# Patient Record
Sex: MALE | Race: ASIAN | NOT HISPANIC OR LATINO | Employment: UNEMPLOYED | URBAN - METROPOLITAN AREA
[De-identification: names, ages, dates, MRNs, and addresses within clinical notes are randomized per-mention and may not be internally consistent; named-entity substitution may affect disease eponyms.]

---

## 2019-06-23 ENCOUNTER — HOSPITAL ENCOUNTER (EMERGENCY)
Facility: OTHER | Age: 39
Discharge: HOME | End: 2019-06-23
Attending: EMERGENCY MEDICINE
Payer: COMMERCIAL

## 2019-06-23 VITALS
DIASTOLIC BLOOD PRESSURE: 62 MMHG | TEMPERATURE: 99 F | RESPIRATION RATE: 16 BRPM | HEART RATE: 104 BPM | OXYGEN SATURATION: 97 % | WEIGHT: 222 LBS | SYSTOLIC BLOOD PRESSURE: 125 MMHG

## 2019-06-23 DIAGNOSIS — S86.111A STRAIN OF RIGHT GASTROCNEMIUS MUSCLE, INITIAL ENCOUNTER: Primary | ICD-10-CM

## 2019-06-23 PROCEDURE — 25000003 PHARM REV CODE 250: Performed by: EMERGENCY MEDICINE

## 2019-06-23 PROCEDURE — 99284 EMERGENCY DEPT VISIT MOD MDM: CPT

## 2019-06-23 RX ORDER — NAPROXEN 500 MG/1
500 TABLET ORAL 2 TIMES DAILY PRN
Qty: 60 TABLET | Refills: 0 | Status: SHIPPED | OUTPATIENT
Start: 2019-06-23

## 2019-06-23 RX ORDER — METHOCARBAMOL 750 MG/1
1500 TABLET, FILM COATED ORAL 3 TIMES DAILY PRN
Qty: 30 TABLET | Refills: 0 | Status: SHIPPED | OUTPATIENT
Start: 2019-06-23 | End: 2019-06-28

## 2019-06-23 RX ORDER — NAPROXEN 500 MG/1
500 TABLET ORAL
Status: COMPLETED | OUTPATIENT
Start: 2019-06-23 | End: 2019-06-23

## 2019-06-23 RX ORDER — LIDOCAINE 50 MG/G
PATCH TOPICAL
Qty: 30 PATCH | Refills: 0 | Status: SHIPPED | OUTPATIENT
Start: 2019-06-23 | End: 2019-06-25 | Stop reason: ALTCHOICE

## 2019-06-23 RX ADMIN — NAPROXEN 500 MG: 500 TABLET ORAL at 07:06

## 2019-06-24 NOTE — DISCHARGE INSTRUCTIONS
Call your primary care doctor to make the first available appointment.     Keep all your medical appointments.     Take your regular medication as prescribed. Contact your primary care provider before running out of medication, or for any problems obtaining them.    Do not drive or operate heavy machinery while taking opioid or muscle relaxing medications. Examples include norco, percocet, xanax, valium, flexeril.     Overuse or long term use of pain and sedating medication may lead to addiction, dependence, organ failure, family and work problems, legal problems, accidental overdose and death.    If you do not have health insurance, you probably qualify for heavily discounted rates:  Call 1-195.639.4204 ([facility] hotline) or go to [email or web address].gov    Your evaluation in the ED does not suggest any emergent or life threatening medical condition requiring admission or immediate intervention beyond that provided in the ED.   However, the signs of a serious problem sometimes take more time to appear.   RETURN TO THE ER if any of the following occur:    Weakness, dizziness, fainting, or loss of consciousness   Fever of 100.4ºF (38ºC) or higher  Any worse symptoms  Any new or concerning symptoms    Do the following to improve pain and swelling:  Rest. Limit the use of the injured body part. This helps prevent further damage to the body part and gives it time to heal. In some cases, you may need a sling, brace, splint, or cast to help keep the body part still until it has healed.   Ice. Applying ice right after an injury helps relieve pain and swelling. Wrap a bag of ice in a thin towel. (Frozen peas also works well.) Then, place it over the injured area. Do this for 10 to 15 minutes every 3 to 4 hours. Continue for the next 1 to 2 days or until your symptoms improve. Never put ice directly on your skin or ice an area longer than 15 minutes at a time.   Compression. Putting pressure on an injury helps reduce swelling and  provides support. Wrap the injured area firmly with an elastic bandage (ACE wrap). Make sure not to wrap the bandage too tightly or you will cut off blood flow to the injured area. If your bandage loosens, rewrap it. Do not wear an elastic bandage overnight.  Elevation. Keeping an injury raised above the level of your heart reduces swelling, pain, and throbbing. For instance, if you have a broken leg, it may help to rest your leg on several pillows when sitting or lying down.

## 2019-06-24 NOTE — ED PROVIDER NOTES
"Encounter Date: 6/23/2019    SCRIBE #1 NOTE: I, Penny Eden, am scribing for, and in the presence of, Dr. Mariano.       History     Chief Complaint   Patient presents with    calf pain     right calf pain after jumping into pool, increased pain with ambulation, unable to bear full weight. No swelling or bruising noted.      Time seen by provider: 7:22 PM    This is a 39 y.o. male who presents with complaint of right calf pain. Onset began after jumping in the pool while playing volleyball about one hour ago. He states he "felt the muscle pop out." He experiences the pain only with weight bearing. He denies any other injury. No paraesthesias.    The history is provided by the patient.     Review of patient's allergies indicates:   Allergen Reactions    Sulfa (sulfonamide antibiotics)      Hives       Past Medical History:   Diagnosis Date    Asthma      History reviewed. No pertinent surgical history.  History reviewed. No pertinent family history.  Social History     Tobacco Use    Smoking status: Current Some Day Smoker   Substance Use Topics    Alcohol use: Yes     Comment: social    Drug use: Not Currently     Review of Systems  ROS: As per HPI and below:   General: No fever. No chills. No generalized weakness.   HENT: No sore throat. No rhinorrhea. No facial pain. No facial swelling.   Eyes: No visual changes. No eye pain.   Cardiovascular: No chest pain.   Respiratory: No dyspnea. No cough.   GI: No abdominal pain. No nausea. No vomiting. No diarrhea.   : No dysuria. No hematuria.   Skin: No rashes.  Neuro: No focal weakness. No headache. No syncope. No paraesthesias.  Musculoskeletal: Right calf pain.  Psych: No acute changes.  All other systems negative.     Physical Exam     Initial Vitals [06/23/19 1853]   BP Pulse Resp Temp SpO2   125/62 104 16 98.7 °F (37.1 °C) 97 %      MAP       --         Physical Exam  /62 (BP Location: Left arm, Patient Position: Sitting)   Pulse 104   Temp 98.7 °F " (37.1 °C) (Oral)   Resp 16   Wt 100.7 kg (222 lb)   SpO2 97%     Nursing note and vitals reviewed.  Constitutional: AAOx3. Well appearing.   Eyes: EOMI. No discharge. Anicteric.  HENT:   Mouth/Throat: Oropharynx is clear and moist. Uvula midline.   Neck: Normal range of motion. Neck supple.    Cardiovascular: Normal rate  Pulmonary/Chest: No respiratory distress.   Abdominal: No distension   Musculoskeletal: Normal range of motion. Right Knee: Tenderness at medial calf well localized to the left mid calf. Normal active dorsal flexion and plantar flexion. No achilles's tenderness or defect. No edema. No masses or effusions appreciated. FROM. Normal valgus and varus stresses. Normal anterior and posterior drawer tests. Compartments soft. Distally neurovascularly intact.   Neurological: GCS15. Alert and oriented to person, place, and time. No gross focal strength or light touch deficit.   Skin: Skin is warm and dry.   Psychiatric: Behavior is normal. Judgment normal.    ED Course   Procedures  Labs Reviewed - No data to display       Imaging Results    None                     Scribe Attestation:   Scribe #1: I performed the above scribed service and the documentation accurately describes the services I performed. I attest to the accuracy of the note.    Attending Attestation:           Physician Attestation for Scribe:  Physician Attestation Statement for Scribe #1: I, Dr. Mariano, reviewed documentation, as scribed by Penny Eden in my presence, and it is both accurate and complete.         Attending ED Notes:   Patient is a 39-year-old male with asthma who presents with acute onset right calf pain when jumping in a pool while playing volleyball.  On exam patient unable to bear weight on his right leg, has medial calf tenderness, neurovascularly intact distally, no Achilles tendon tenderness or swelling, normal active dorsiflexion/plantar flexion.  Clinically the patient appears to have gastrocnemius strain and  possible incomplete tear.  Plan is maximal supportive care, rice therapy, PCP follow-up for likely physical therapy referral.  I discussed with patient and/or guardian/caretaker that this evaluation in the ED does not suggest any emergent or life threatening medical condition requiring admission or immediate intervention beyond what was provided in the ED. Regardless, an unremarkable evaluation in the ED does not preclude the development or presence of a serious or life threatening condition. As such, patient was instructed to return immediately for any worsening or change in current symptoms.     I had a detailed discussion with patient  and/or guardian/caretaker regarding findings, plan, return precautions, importance of medication adherence, need to follow-up with a PCP and specialist. All questions answered.     Note was created using voice recognition software. Note may have occasional typographical errors that may not have been identified and edited despite initial review prior to signing.               Clinical Impression:     1. Strain of right gastrocnemius muscle, initial encounter                                 Javier Mariano MD  06/24/19 0702

## 2019-06-24 NOTE — ED TRIAGE NOTES
Pt arrives ot Ed with c/o right leg pain. Pt reports jumping an hearing a pop, pt reports being unable to bear weight. Pt denies pain without weight on leg. Pt reports pain to calf. Pt sitting inn exam bed, respirations even, unlabored, eyes open spontaneously, NAD noted, AAOx4, answering questions appropriately.

## 2019-06-25 ENCOUNTER — OFFICE VISIT (OUTPATIENT)
Dept: INTERNAL MEDICINE | Facility: CLINIC | Age: 39
End: 2019-06-25
Attending: FAMILY MEDICINE
Payer: COMMERCIAL

## 2019-06-25 ENCOUNTER — HOSPITAL ENCOUNTER (OUTPATIENT)
Dept: RADIOLOGY | Facility: HOSPITAL | Age: 39
Discharge: HOME | End: 2019-06-25
Attending: PHYSICIAN ASSISTANT
Payer: COMMERCIAL

## 2019-06-25 ENCOUNTER — OFFICE VISIT (OUTPATIENT)
Dept: ORTHOPEDICS | Facility: CLINIC | Age: 39
End: 2019-06-25
Payer: COMMERCIAL

## 2019-06-25 VITALS — BODY MASS INDEX: 32.34 KG/M2 | HEIGHT: 70 IN | WEIGHT: 225.88 LBS

## 2019-06-25 VITALS
HEART RATE: 69 BPM | SYSTOLIC BLOOD PRESSURE: 122 MMHG | HEIGHT: 70 IN | DIASTOLIC BLOOD PRESSURE: 80 MMHG | WEIGHT: 224 LBS | BODY MASS INDEX: 32.07 KG/M2 | OXYGEN SATURATION: 97 %

## 2019-06-25 DIAGNOSIS — S86.811A STRAIN OF CALF MUSCLE, RIGHT, INITIAL ENCOUNTER: Primary | ICD-10-CM

## 2019-06-25 DIAGNOSIS — S86.811D STRAIN OF CALF MUSCLE, RIGHT, SUBSEQUENT ENCOUNTER: Primary | ICD-10-CM

## 2019-06-25 DIAGNOSIS — M79.604 PAIN OF RIGHT LOWER EXTREMITY: Primary | ICD-10-CM

## 2019-06-25 DIAGNOSIS — M79.604 PAIN OF RIGHT LOWER EXTREMITY: ICD-10-CM

## 2019-06-25 DIAGNOSIS — R07.89 ATYPICAL CHEST PAIN: ICD-10-CM

## 2019-06-25 PROCEDURE — 99203 PR OFFICE/OUTPT VISIT, NEW, LEVL III, 30-44 MIN: ICD-10-PCS | Mod: S$GLB,,, | Performed by: PHYSICIAN ASSISTANT

## 2019-06-25 PROCEDURE — 99999 PR PBB SHADOW E&M-EST. PATIENT-LVL III: CPT | Mod: PBBFAC,,, | Performed by: PHYSICIAN ASSISTANT

## 2019-06-25 PROCEDURE — 99203 OFFICE O/P NEW LOW 30 MIN: CPT | Mod: S$GLB,,, | Performed by: PHYSICIAN ASSISTANT

## 2019-06-25 PROCEDURE — 73590 X-RAY EXAM OF LOWER LEG: CPT | Mod: 26,RT,, | Performed by: RADIOLOGY

## 2019-06-25 PROCEDURE — 3008F BODY MASS INDEX DOCD: CPT | Mod: CPTII,S$GLB,, | Performed by: FAMILY MEDICINE

## 2019-06-25 PROCEDURE — 73590 X-RAY EXAM OF LOWER LEG: CPT | Mod: TC,RT

## 2019-06-25 PROCEDURE — 3008F PR BODY MASS INDEX (BMI) DOCUMENTED: ICD-10-PCS | Mod: CPTII,S$GLB,, | Performed by: PHYSICIAN ASSISTANT

## 2019-06-25 PROCEDURE — 99999 PR PBB SHADOW E&M-EST. PATIENT-LVL III: CPT | Mod: PBBFAC,,, | Performed by: FAMILY MEDICINE

## 2019-06-25 PROCEDURE — 99204 PR OFFICE/OUTPT VISIT, NEW, LEVL IV, 45-59 MIN: ICD-10-PCS | Mod: S$GLB,,, | Performed by: FAMILY MEDICINE

## 2019-06-25 PROCEDURE — 99999 PR PBB SHADOW E&M-EST. PATIENT-LVL III: ICD-10-PCS | Mod: PBBFAC,,, | Performed by: FAMILY MEDICINE

## 2019-06-25 PROCEDURE — 99999 PR PBB SHADOW E&M-EST. PATIENT-LVL III: ICD-10-PCS | Mod: PBBFAC,,, | Performed by: PHYSICIAN ASSISTANT

## 2019-06-25 PROCEDURE — 3008F BODY MASS INDEX DOCD: CPT | Mod: CPTII,S$GLB,, | Performed by: PHYSICIAN ASSISTANT

## 2019-06-25 PROCEDURE — 3008F PR BODY MASS INDEX (BMI) DOCUMENTED: ICD-10-PCS | Mod: CPTII,S$GLB,, | Performed by: FAMILY MEDICINE

## 2019-06-25 PROCEDURE — 73590 XR TIBIA FIBULA 2 VIEW RIGHT: ICD-10-PCS | Mod: 26,RT,, | Performed by: RADIOLOGY

## 2019-06-25 PROCEDURE — 99204 OFFICE O/P NEW MOD 45 MIN: CPT | Mod: S$GLB,,, | Performed by: FAMILY MEDICINE

## 2019-06-25 NOTE — PROGRESS NOTES
Subjective:      Patient ID: Junior Coombs is a 39 y.o. male.    Chief Complaint: Pain of the Right Lower Leg    HPI  39 year old male presents with chief complaint of right calf pain x 2 days. He jumped out of the pool playing volleyball and felt the calf pop. He couldn't get out of the pool initially. He went to the ED and was given crutches. He has been WBAT with 1 crutch. He reports pain at the medial calf with WB. He denies pain at rest. He took naproxen and robaxin for 2 days with no relief. He denies swelling and bruising. No knee or ankle pain.   Review of Systems   Constitution: Negative for chills, fever and night sweats.   Cardiovascular: Negative for chest pain.   Respiratory: Negative for cough and shortness of breath.    Hematologic/Lymphatic: Does not bruise/bleed easily.   Skin: Negative for color change.   Gastrointestinal: Negative for heartburn.   Genitourinary: Negative for dysuria.   Neurological: Negative for numbness and paresthesias.   Psychiatric/Behavioral: Negative for altered mental status.   Allergic/Immunologic: Negative for persistent infections.         Objective:            General    Vitals reviewed.  Constitutional: He is oriented to person, place, and time. He appears well-developed and well-nourished.   Cardiovascular: Normal rate.    Neurological: He is alert and oriented to person, place, and time.             Right LE Exam:  Normal ROM at the knee and ankle  No swelling  No bruising  TTP posteromedial calf  Normal sprague test      X-ray: ordered and reviewed by myself. There is remote trauma with deformity of the proximal tib fib.  No fracture dislocation bone destruction seen.  No acute process seen.      Assessment:       Encounter Diagnosis   Name Primary?    Strain of calf muscle, right, initial encounter Yes          Plan:       Discussed treatment options with patient. He will rest and elevate leg. Alternate ice, heat. He will take naproxen BID x 2 weeks then prn.  Robaxin as needed. RTC in 4 weeks for re-evaluation.

## 2019-06-25 NOTE — LETTER
June 27, 2019      Leon Peoples MD  [address]  [facility] [address]  [facility] [address]           [facility]  [address]  [facility] [address]  Phone: [phone]  Fax: [phone]          Patient: Junior Coombs   MR Number: [id number]   YOB: 1980   Date of Visit: 6/25/2019       Dear Dr. Leon Peoples:    Thank you for referring Junior Coombs to me for evaluation. Attached you will find relevant portions of my assessment and plan of care.    If you have questions, please do not hesitate to call me. I look forward to following Junior Coombs along with you.    Sincerely,    Nuria Mccurdy PA-C    Enclosure  CC:  No Recipients    If you would like to receive this communication electronically, please contact [email or web address] or [phone] to request more information on [email or web address] Link access.    For providers and/or their staff who would like to refer a patient to [facility], please contact us through our one-stop-shop provider referral line, [facility], at [phone].    If you feel you have received this communication in error or would no longer like to receive these types of communications, please e-mail [email or web address]

## 2019-06-25 NOTE — PROGRESS NOTES
CHIEF COMPLAINT: Establish a primary care physician    HISTORY OF PRESENT ILLNESS: The patient is a generally healthy 39 year-old male.  The patient injured his right calf 3 days ago well jumping out of a pool in a water volleyball game.  Pain was severe.  He went to the emergency room and was given crutches.  He does not appear to have an Achilles injury but he does appear to have a gastrocnemius injury.  He is only able to ambulate with crutches.  He cannot bear any weight on the affected leg.      He is a former heavy smoker.  For 15 years he has reproducible musculoskeletal chest pain on the right pectoral region.  It is worse with moving the right arm.    It has been a while since the patient has seen a primary care physician.  The patient wishes to establish a primary care physician.     REVIEW OF SYSTEMS:  GENERAL: No fever, chills, fatigability or weight loss.  SKIN: No rashes, itching or changes in color or texture of skin.  HEAD: No headaches or recent head trauma.  EYES: Visual acuity fine. No photophobia, ocular pain or diplopia.  EARS: Denies ear pain, discharge or vertigo.  NOSE: No loss of smell, no epistaxis or postnasal drip.  MOUTH & THROAT: No hoarseness or change in voice. No excessive gum bleeding.  NODES: Denies swollen glands.  CHEST: Denies MORENO, cyanosis, wheezing, cough and sputum production.  CARDIOVASCULAR: Denies chest pain, PND, orthopnea or reduced exercise tolerance.  ABDOMEN: Appetite fine. No weight loss. Denies diarrhea, abdominal pain, hematemesis or blood in stool.  URINARY: No flank pain, dysuria or hematuria.  PERIPHERAL VASCULAR: No claudication or cyanosis.  MUSCULOSKELETAL: No joint stiffness or swelling. Denies back pain.  NEUROLOGIC: No history of seizures, paralysis, alteration of gait or coordination.    SOCIAL HISTORY: The patient does not smoke.  The patient consumes alcohol socially.  The patient is single.    PHYSICAL EXAMINATION:       APPEARANCE: Well nourished,  well developed, in no acute distress.    HEAD: Normocephalic, atraumatic.  EYES: PERRL. EOMI.  Conjunctivae without injection and  anicteric  EARS: TM's intact. Light reflex normal. No retraction or perforation.    NOSE: Mucosa pink. Airway clear.  MOUTH & THROAT: No tonsillar enlargement. No pharyngeal erythema or exudate. No stridor.  NECK: Supple.   NODES: No cervical, axillary or inguinal lymph node enlargement.  CHEST: Lungs clear to auscultation.  No retractions are noted.  No rales or rhonchi are present.  CARDIOVASCULAR: Normal S1, S2. No rubs, murmurs or gallops.  ABDOMEN: Bowel sounds normal. Not distended. Soft. No tenderness or masses.  No ascites is noted.  MUSCULOSKELETAL:  There is no clubbing, cyanosis, or edema of the extremities x4.  There is full range of motion of the lumbar spine.  There is full range of motion of the extremities x4.  There is no deformity noted.  There is point tenderness and some deformity of the right gastrocnemius laterally.  Achilles is intact  NEUROLOGIC:       Normal speech development.      Hearing normal.  He is ambulating with crutches      Motor and sensory exams grossly normal.  PSYCHIATRIC: Patient is alert and oriented x3.  Thought processes are all normal.  There is no homicidality.  There is no suicidality.  There is no evidence of psychosis.    LABORATORY/RADIOLOGY:   Chart reviewed.      ASSESSMENT:    right gastrocnemius injury  Fifteen years of right-sided musculoskeletal chest pain    PLAN:  See Orthopedics today  He is reassured  Return to clinic in 1 year

## 2019-07-23 ENCOUNTER — OFFICE VISIT (OUTPATIENT)
Dept: ORTHOPEDICS | Facility: CLINIC | Age: 39
End: 2019-07-23
Payer: COMMERCIAL

## 2019-07-23 VITALS — HEIGHT: 70 IN | WEIGHT: 227.75 LBS | BODY MASS INDEX: 32.61 KG/M2

## 2019-07-23 DIAGNOSIS — S86.811D STRAIN OF CALF MUSCLE, RIGHT, SUBSEQUENT ENCOUNTER: Primary | ICD-10-CM

## 2019-07-23 PROCEDURE — 99999 PR PBB SHADOW E&M-EST. PATIENT-LVL III: ICD-10-PCS | Mod: PBBFAC,,, | Performed by: PHYSICIAN ASSISTANT

## 2019-07-23 PROCEDURE — 99999 PR PBB SHADOW E&M-EST. PATIENT-LVL III: CPT | Mod: PBBFAC,,, | Performed by: PHYSICIAN ASSISTANT

## 2019-07-23 PROCEDURE — 3008F BODY MASS INDEX DOCD: CPT | Mod: CPTII,S$GLB,, | Performed by: PHYSICIAN ASSISTANT

## 2019-07-23 PROCEDURE — 99213 PR OFFICE/OUTPT VISIT, EST, LEVL III, 20-29 MIN: ICD-10-PCS | Mod: S$GLB,,, | Performed by: PHYSICIAN ASSISTANT

## 2019-07-23 PROCEDURE — 99213 OFFICE O/P EST LOW 20 MIN: CPT | Mod: S$GLB,,, | Performed by: PHYSICIAN ASSISTANT

## 2019-07-23 PROCEDURE — 3008F PR BODY MASS INDEX (BMI) DOCUMENTED: ICD-10-PCS | Mod: CPTII,S$GLB,, | Performed by: PHYSICIAN ASSISTANT

## 2019-07-23 NOTE — PROGRESS NOTES
Subjective:      Patient ID: Junior Coombs is a 39 y.o. male.    Chief Complaint: Pain of the Right Lower Leg    HPI  Patient returns for f/u for his right calf strain that occurred about 4 weeks ago. He denies pain and swelling. He took the naproxen until about 4 days ago and says it helped a lot. No bruising. He hasn't used crutches in about 2 weeks.   Review of Systems   Constitution: Negative for chills, fever and night sweats.   Cardiovascular: Negative for chest pain.   Respiratory: Negative for cough and shortness of breath.    Hematologic/Lymphatic: Does not bruise/bleed easily.   Skin: Negative for color change.   Gastrointestinal: Negative for heartburn.   Genitourinary: Negative for dysuria.   Neurological: Negative for numbness and paresthesias.   Psychiatric/Behavioral: Negative for altered mental status.   Allergic/Immunologic: Negative for persistent infections.         Objective:            General    Vitals reviewed.  Constitutional: He is oriented to person, place, and time. He appears well-developed and well-nourished.   Cardiovascular: Normal rate.    Neurological: He is alert and oriented to person, place, and time.             Right LE Exam:  Normal ROM at the knee and ankle  No swelling  No bruising  No TTP   Normal sprague test          Assessment:       Encounter Diagnosis   Name Primary?    Strain of calf muscle, right, subsequent encounter Yes          Plan:       Patient is doing well and denies pain. He will f/u as needed.

## 2020-10-05 ENCOUNTER — PATIENT MESSAGE (OUTPATIENT)
Dept: ADMINISTRATIVE | Facility: HOSPITAL | Age: 40
End: 2020-10-05

## 2020-10-12 ENCOUNTER — OFFICE VISIT (OUTPATIENT)
Dept: URGENT CARE | Facility: CLINIC | Age: 40
End: 2020-10-12
Payer: COMMERCIAL

## 2020-10-12 VITALS
BODY MASS INDEX: 32.5 KG/M2 | WEIGHT: 227 LBS | RESPIRATION RATE: 18 BRPM | HEART RATE: 64 BPM | OXYGEN SATURATION: 95 % | SYSTOLIC BLOOD PRESSURE: 133 MMHG | DIASTOLIC BLOOD PRESSURE: 86 MMHG | HEIGHT: 70 IN | TEMPERATURE: 98 F

## 2020-10-12 DIAGNOSIS — R43.2 LOSS OF TASTE: ICD-10-CM

## 2020-10-12 DIAGNOSIS — R09.81 SINUS CONGESTION: Primary | ICD-10-CM

## 2020-10-12 LAB
CTP QC/QA: YES
SARS-COV-2 RDRP RESP QL NAA+PROBE: NEGATIVE

## 2020-10-12 PROCEDURE — 99214 OFFICE O/P EST MOD 30 MIN: CPT | Mod: S$GLB,,, | Performed by: NURSE PRACTITIONER

## 2020-10-12 PROCEDURE — 99214 PR OFFICE/OUTPT VISIT, EST, LEVL IV, 30-39 MIN: ICD-10-PCS | Mod: S$GLB,,, | Performed by: NURSE PRACTITIONER

## 2020-10-12 PROCEDURE — U0002: ICD-10-PCS | Mod: QW,S$GLB,, | Performed by: NURSE PRACTITIONER

## 2020-10-12 PROCEDURE — U0002 COVID-19 LAB TEST NON-CDC: HCPCS | Mod: QW,S$GLB,, | Performed by: NURSE PRACTITIONER

## 2020-10-12 NOTE — PATIENT INSTRUCTIONS
Go to the Emergency Room if symptoms or condition worsens in any way     As discussed, your test today was negative, given symptoms I would take caution and quarantine away from pregnant wife.  If your symptoms continue or worsen please follow up as discussed.     Zyrtec, Claritin, or Allegra OTC as directed for the next 7 days     Flonase OTC as directed for the next 7 days     Salt Water Nasal Spray OTC 3x/day for the next 7 days     Tylenol 500mg 2 tabs by mouth every 6 hours     Mucinex or Robitussin OTC as directed for cough    Sudafed as directed for runny nose and congestion    Sinusitis (No Antibiotics)    The sinuses are air-filled spaces within the bones of the face. They connect to the inside of the nose. Sinusitis is an inflammation of the tissue lining the sinus cavity. Sinus inflammation can occur during a cold. It can also be due to allergies to pollens and other particles in the air. It can cause symptoms such as sinus congestion, headache, sore throat, facial swelling and fullness. It may also cause a low-grade fever. No infection is present, and no antibiotic treatment is needed.  Home care  · Drink plenty of water, hot tea, and other liquids. This may help thin mucus. It also may promote sinus drainage.  · Heat may help soothe painful areas of the face. Use a towel soaked in hot water. Or,  the shower and direct the hot spray onto your face. Using a vaporizer along with a menthol rub at night may also help.   · An expectorant containing guaifenesin may help thin the mucus and promote drainage from the sinuses.  · Over-the-counter decongestants may be used unless a similar medicine was prescribed. Nasal sprays work the fastest. Use one that contains phenylephrine or oxymetazoline. First blow the nose gently. Then use the spray. Do not use these medicines more often than directed on the label or symptoms may get worse. You may also use tablets containing pseudoephedrine. Avoid products that  combine ingredients, because side effects may be increased. Read labels. You can also ask the pharmacist for help. (NOTE: Persons with high blood pressure should not use decongestants. They can raise blood pressure.)  · Over-the-counter antihistamines may help if allergies contributed to your sinusitis.    · Use acetaminophen or ibuprofen to control pain, unless another pain medicine was prescribed. (If you have chronic liver or kidney disease or ever had a stomach ulcer, talk with your doctor before using these medicines. Aspirin should never be used in anyone under 18 years of age who is ill with a fever. It may cause severe liver damage.)  · Use nasal rinses or irrigation as instructed by your health care provider.  · Don't smoke. This can worsen symptoms.  Follow-up care  Follow up with your healthcare provider or our staff if you are not improving within the next week.  When to seek medical advice  Call your healthcare provider if any of these occur:  · Green or yellow discharge from the nose or into the throat  · Facial pain or headache becoming more severe  · Stiff neck  · Unusual drowsiness or confusion  · Swelling of the forehead or eyelids  · Vision problems, including blurred or double vision  · Fever of 100.4ºF (38ºC) or higher, or as directed by your healthcare provider  · Seizure  · Breathing problems  · Symptoms not resolving within 10 days  Date Last Reviewed: 4/13/2015  © 7841-5979 [employer] [address] [place] [address], [place] [address]. All rights reserved. This information is not intended as a substitute for professional medical care. Always follow your healthcare professional's instructions.

## 2020-10-12 NOTE — PROGRESS NOTES
"Subjective:       Patient ID: Junior Coombs is a 40 y.o. male.    Vitals:  height is 5' 10" (1.778 m) and weight is 103 kg (227 lb). His temperature is 98.2 °F (36.8 °C). His blood pressure is 133/86 and his pulse is 64. His respiration is 18 and oxygen saturation is 95%.     Chief Complaint: Sinus Problem    Pt states sinus congestion and loss of taste and smell today.  Denies fever, chills, or body aches.  Symptoms started today.  Denies any known covid 19 exposure.    Sinus Problem  This is a new problem. The current episode started today. The problem is unchanged. There has been no fever. Associated symptoms include congestion, headaches and sinus pressure. Pertinent negatives include no chills, coughing, diaphoresis, ear pain, hoarse voice, neck pain, shortness of breath, sneezing, sore throat or swollen glands. Treatments tried: allertec. The treatment provided mild relief.       Constitution: Negative for chills, sweating, fatigue and fever.   HENT: Positive for congestion and sinus pressure. Negative for ear pain and sore throat.    Neck: Negative for neck pain and painful lymph nodes.   Cardiovascular: Negative for chest pain and leg swelling.   Eyes: Negative for double vision and blurred vision.   Respiratory: Negative for cough and shortness of breath.    Gastrointestinal: Negative for nausea, vomiting and diarrhea.   Genitourinary: Negative for dysuria, frequency and urgency.   Musculoskeletal: Negative for joint pain, joint swelling, muscle cramps and muscle ache.   Skin: Negative for color change, pale and rash.   Allergic/Immunologic: Negative for seasonal allergies and sneezing.   Neurological: Positive for headaches. Negative for dizziness, history of vertigo, light-headedness and passing out.   Hematologic/Lymphatic: Negative for swollen lymph nodes, easy bruising/bleeding and history of blood clots. Does not bruise/bleed easily.   Psychiatric/Behavioral: Negative for nervous/anxious, sleep " disturbance and depression. The patient is not nervous/anxious.        Objective:      Physical Exam   Constitutional: He is oriented to person, place, and time. He appears well-developed. He is cooperative.  Non-toxic appearance. He does not appear ill. No distress.   HENT:   Head: Normocephalic and atraumatic.   Ears:   Right Ear: Hearing, tympanic membrane, external ear and ear canal normal.   Left Ear: Hearing, tympanic membrane, external ear and ear canal normal.   Nose: Nose normal. No mucosal edema, rhinorrhea or nasal deformity. No epistaxis. Right sinus exhibits no maxillary sinus tenderness and no frontal sinus tenderness. Left sinus exhibits no maxillary sinus tenderness and no frontal sinus tenderness.   Mouth/Throat: Uvula is midline, oropharynx is clear and moist and mucous membranes are normal. No trismus in the jaw. Normal dentition. No uvula swelling. No oropharyngeal exudate, posterior oropharyngeal edema or posterior oropharyngeal erythema.   Eyes: Conjunctivae and lids are normal. No scleral icterus.   Neck: Trachea normal, full passive range of motion without pain and phonation normal. Neck supple. No neck rigidity. No edema and no erythema present.   Cardiovascular: Normal rate, regular rhythm, normal heart sounds and normal pulses.   Pulmonary/Chest: Effort normal and breath sounds normal. No respiratory distress. He has no decreased breath sounds. He has no rhonchi.   Abdominal: Normal appearance.   Musculoskeletal: Normal range of motion.         General: No deformity.   Neurological: He is alert and oriented to person, place, and time. He exhibits normal muscle tone. Coordination normal.   Skin: Skin is warm, dry, intact, not diaphoretic and not pale. Psychiatric: His speech is normal and behavior is normal. Judgment and thought content normal.   Nursing note and vitals reviewed.    Results for orders placed or performed in visit on 10/12/20   POCT COVID-19 Rapid Screening   Result Value  Ref Range    POC Rapid COVID Negative Negative     Acceptable Yes          Assessment:       1. Sinus congestion    2. Loss of taste        Plan:       Lab reviewed.  Sinus congestion  -     POCT COVID-19 Rapid Screening    Loss of taste      Patient Instructions   Go to the Emergency Room if symptoms or condition worsens in any way     As discussed, your test today was negative, given symptoms I would take caution and quarantine away from pregnant wife.  If your symptoms continue or worsen please follow up as discussed.     Zyrtec, Claritin, or Allegra OTC as directed for the next 7 days     Flonase OTC as directed for the next 7 days     Salt Water Nasal Spray OTC 3x/day for the next 7 days     Tylenol 500mg 2 tabs by mouth every 6 hours     Mucinex or Robitussin OTC as directed for cough    Sudafed as directed for runny nose and congestion

## 2021-01-05 ENCOUNTER — PATIENT MESSAGE (OUTPATIENT)
Dept: ADMINISTRATIVE | Facility: HOSPITAL | Age: 41
End: 2021-01-05

## 2021-02-17 ENCOUNTER — IMMUNIZATION (OUTPATIENT)
Dept: PHARMACY | Facility: CLINIC | Age: 41
End: 2021-02-17
Payer: COMMERCIAL

## 2021-04-05 ENCOUNTER — PATIENT MESSAGE (OUTPATIENT)
Dept: ADMINISTRATIVE | Facility: HOSPITAL | Age: 41
End: 2021-04-05

## 2021-04-28 ENCOUNTER — PATIENT MESSAGE (OUTPATIENT)
Dept: RESEARCH | Facility: HOSPITAL | Age: 41
End: 2021-04-28

## 2021-10-05 ENCOUNTER — PATIENT MESSAGE (OUTPATIENT)
Dept: ADMINISTRATIVE | Facility: HOSPITAL | Age: 41
End: 2021-10-05

## 2021-12-01 ENCOUNTER — OFFICE VISIT (OUTPATIENT)
Dept: INTERNAL MEDICINE | Facility: CLINIC | Age: 41
End: 2021-12-01
Attending: FAMILY MEDICINE
Payer: COMMERCIAL

## 2021-12-01 DIAGNOSIS — M54.50 ACUTE RIGHT-SIDED LOW BACK PAIN WITHOUT SCIATICA: Primary | ICD-10-CM

## 2021-12-01 PROCEDURE — 99214 OFFICE O/P EST MOD 30 MIN: CPT | Mod: 95,,, | Performed by: FAMILY MEDICINE

## 2021-12-01 PROCEDURE — 99214 PR OFFICE/OUTPT VISIT, EST, LEVL IV, 30-39 MIN: ICD-10-PCS | Mod: 95,,, | Performed by: FAMILY MEDICINE

## 2021-12-01 RX ORDER — NABUMETONE 500 MG/1
500 TABLET, FILM COATED ORAL 2 TIMES DAILY
Qty: 60 TABLET | Refills: 3 | Status: SHIPPED | OUTPATIENT
Start: 2021-12-01 | End: 2021-12-01 | Stop reason: SDUPTHER

## 2021-12-09 ENCOUNTER — OFFICE VISIT (OUTPATIENT)
Dept: SPINE | Facility: CLINIC | Age: 41
End: 2021-12-09
Attending: FAMILY MEDICINE
Payer: COMMERCIAL

## 2021-12-09 VITALS
SYSTOLIC BLOOD PRESSURE: 142 MMHG | HEART RATE: 76 BPM | HEIGHT: 70 IN | WEIGHT: 227.06 LBS | DIASTOLIC BLOOD PRESSURE: 96 MMHG | BODY MASS INDEX: 32.51 KG/M2

## 2021-12-09 DIAGNOSIS — S39.011A STRAIN OF ABDOMINAL MUSCLE, INITIAL ENCOUNTER: Primary | ICD-10-CM

## 2021-12-09 DIAGNOSIS — M79.10 MYALGIA: ICD-10-CM

## 2021-12-09 DIAGNOSIS — M54.50 ACUTE RIGHT-SIDED LOW BACK PAIN WITHOUT SCIATICA: ICD-10-CM

## 2021-12-09 PROCEDURE — 99213 OFFICE O/P EST LOW 20 MIN: CPT | Mod: PBBFAC | Performed by: STUDENT IN AN ORGANIZED HEALTH CARE EDUCATION/TRAINING PROGRAM

## 2021-12-09 PROCEDURE — 99204 PR OFFICE/OUTPT VISIT, NEW, LEVL IV, 45-59 MIN: ICD-10-PCS | Mod: S$GLB,,, | Performed by: STUDENT IN AN ORGANIZED HEALTH CARE EDUCATION/TRAINING PROGRAM

## 2021-12-09 PROCEDURE — 99999 PR PBB SHADOW E&M-EST. PATIENT-LVL III: CPT | Mod: PBBFAC,,, | Performed by: STUDENT IN AN ORGANIZED HEALTH CARE EDUCATION/TRAINING PROGRAM

## 2021-12-09 PROCEDURE — 99204 OFFICE O/P NEW MOD 45 MIN: CPT | Mod: S$GLB,,, | Performed by: STUDENT IN AN ORGANIZED HEALTH CARE EDUCATION/TRAINING PROGRAM

## 2021-12-09 PROCEDURE — 99999 PR PBB SHADOW E&M-EST. PATIENT-LVL III: ICD-10-PCS | Mod: PBBFAC,,, | Performed by: STUDENT IN AN ORGANIZED HEALTH CARE EDUCATION/TRAINING PROGRAM

## 2022-06-29 ENCOUNTER — OFFICE VISIT (OUTPATIENT)
Dept: URGENT CARE | Facility: CLINIC | Age: 42
End: 2022-06-29
Payer: COMMERCIAL

## 2022-06-29 VITALS
HEART RATE: 77 BPM | DIASTOLIC BLOOD PRESSURE: 117 MMHG | BODY MASS INDEX: 32.5 KG/M2 | HEIGHT: 70 IN | SYSTOLIC BLOOD PRESSURE: 169 MMHG | WEIGHT: 227 LBS | TEMPERATURE: 98 F | OXYGEN SATURATION: 96 % | RESPIRATION RATE: 20 BRPM

## 2022-06-29 DIAGNOSIS — R10.32 LEFT LOWER QUADRANT ABDOMINAL PAIN: ICD-10-CM

## 2022-06-29 DIAGNOSIS — R10.9 FLANK PAIN: ICD-10-CM

## 2022-06-29 DIAGNOSIS — K59.00 CONSTIPATION, UNSPECIFIED CONSTIPATION TYPE: ICD-10-CM

## 2022-06-29 DIAGNOSIS — N20.0 KIDNEY STONE ON LEFT SIDE: Primary | ICD-10-CM

## 2022-06-29 LAB
BILIRUB UR QL STRIP: NEGATIVE
GLUCOSE UR QL STRIP: NEGATIVE
KETONES UR QL STRIP: POSITIVE
LEUKOCYTE ESTERASE UR QL STRIP: NEGATIVE
PH, POC UA: 5.5 (ref 5–8)
POC BLOOD, URINE: POSITIVE
POC NITRATES, URINE: NEGATIVE
PROT UR QL STRIP: NEGATIVE
SP GR UR STRIP: 1 (ref 1–1.03)
UROBILINOGEN UR STRIP-ACNC: NORMAL (ref 0.3–2.2)

## 2022-06-29 PROCEDURE — 81003 POCT URINALYSIS, DIPSTICK, AUTOMATED, W/O SCOPE: ICD-10-PCS | Mod: QW,S$GLB,,

## 2022-06-29 PROCEDURE — 81003 URINALYSIS AUTO W/O SCOPE: CPT | Mod: QW,S$GLB,,

## 2022-06-29 PROCEDURE — 99213 PR OFFICE/OUTPT VISIT, EST, LEVL III, 20-29 MIN: ICD-10-PCS | Mod: S$GLB,,,

## 2022-06-29 PROCEDURE — 99213 OFFICE O/P EST LOW 20 MIN: CPT | Mod: S$GLB,,,

## 2022-06-29 PROCEDURE — 74019 XR ABDOMEN FLAT AND ERECT: ICD-10-PCS | Mod: FY,S$GLB,, | Performed by: INTERNAL MEDICINE

## 2022-06-29 PROCEDURE — 74019 RADEX ABDOMEN 2 VIEWS: CPT | Mod: FY,S$GLB,, | Performed by: INTERNAL MEDICINE

## 2022-06-29 RX ORDER — POLYETHYLENE GLYCOL 3350 17 G/17G
17 POWDER, FOR SOLUTION ORAL DAILY
Qty: 14 EACH | Refills: 0 | Status: SHIPPED | OUTPATIENT
Start: 2022-06-29 | End: 2022-07-13

## 2022-06-29 RX ORDER — HYDROCODONE BITARTRATE AND ACETAMINOPHEN 7.5; 325 MG/1; MG/1
1 TABLET ORAL EVERY 6 HOURS PRN
Qty: 20 TABLET | Refills: 0 | Status: SHIPPED | OUTPATIENT
Start: 2022-06-29 | End: 2022-07-04

## 2022-06-29 RX ORDER — TAMSULOSIN HYDROCHLORIDE 0.4 MG/1
0.4 CAPSULE ORAL DAILY
Qty: 14 CAPSULE | Refills: 0 | Status: SHIPPED | OUTPATIENT
Start: 2022-06-29 | End: 2022-07-13

## 2022-06-29 RX ORDER — TAMSULOSIN HYDROCHLORIDE 0.4 MG/1
0.4 CAPSULE ORAL DAILY
Qty: 14 CAPSULE | Refills: 0 | Status: SHIPPED | OUTPATIENT
Start: 2022-06-29 | End: 2022-06-29

## 2022-06-29 RX ORDER — ACETAMINOPHEN 500 MG
1000 TABLET ORAL
Status: COMPLETED | OUTPATIENT
Start: 2022-06-29 | End: 2022-06-29

## 2022-06-29 RX ORDER — POLYETHYLENE GLYCOL 3350 17 G/17G
17 POWDER, FOR SOLUTION ORAL DAILY
Qty: 14 EACH | Refills: 0 | Status: SHIPPED | OUTPATIENT
Start: 2022-06-29 | End: 2022-06-29

## 2022-06-29 RX ADMIN — Medication 1000 MG: at 03:06

## 2022-06-29 NOTE — PATIENT INSTRUCTIONS
Dietary patterns -- The combination of dietary factors may also have a significant impact upon stone risk. As an example, the DASH diet is high in fruits and vegetables, moderate in low-fat dairy products, and low in animal protein. Based upon an analysis of three large cohorts, adherence to a DASH-style diet was associated with a 40 to 45 percent lower risk for incident kidney stones among males, older females, younger females, high-BMI individuals, and low-BMI individuals [132].    Another example is the Mediterranean diet, a healthy diet associated with lower risk of several conditions including cardiovascular disease and diabetes mellitus that was also found to be associated with a lower risk of kidney stones [133,134]. In the longitudinal study of three large cohorts, higher adherence to a Mediterranean dietary pattern was associated with between 13 and 41 percent lower risk of incident kidney stones independent of potential confounders [134]. Higher adherence to the Mediterranean diet was also associated with higher urinary citrate, magnesium, oxalate, phosphate, uric acid, volume, and pH, and lower urinary sodium, resulting in an overall lower supersaturation for calcium oxalate, calcium phosphate, and uric acid.    Thus, the DASH and Mediterranean diets are reasonable options in the attempt to reduce the risk of stone recurrence. One randomized trial of the DASH diet found a suggestion of a reduced risk based upon change in 24-hour urine chemistries [135], although there are no randomized trials that have examined the effectiveness on reducing risk of actual recurrent kidney stone formation.    - Rest.    - Drink plenty of fluids.    - Acetaminophen (tylenol) or Ibuprofen (advil,motrin) as directed as needed for fever/pain. Avoid tylenol if you have a history of liver disease. Do not take ibuprofen if you have a history of GI bleeding, kidney disease, or if you take blood thinners.   - Ibuprofen dosing for  adults: 400 mg by mouth every 4-6 hours as needed. Max: 2400 mg/day; Info: use lowest effective dose, shortest effective treatment duration; give w/ food if GI upset occurs.  - Tylenol dosing for adults: [By mouth route, immediate-release form] Dose: 325-1000 mg by mouth every 4-6h as needed; Max: 1 g/4h and 4 g/day from all sources. [By mouth route, extended-release form] Dose: 650-1300 mg Extended Release by mouth every 8h as needed; Max: 4 g/day from all sources.     - You must understand that you have received an Urgent Care treatment only and that you may be released before all of your medical problems are known or treated.   - You, the patient, will arrange for follow up care as instructed.   - If your condition worsens or fails to improve we recommend that you receive another evaluation at the ER immediately or contact your PCP to discuss your concerns or return here.   - Follow up with your PCP or specialty clinic as directed in the next 1-2 weeks if not improved or as needed.  You can call [phone] to schedule an appointment with the appropriate provider.    If your symptoms do not improve or worsen, go to the emergency room immediately.

## 2022-06-29 NOTE — PROGRESS NOTES
"Subjective:       Patient ID: Junior Coombs is a 42 y.o. male.    Vitals:  height is 5' 10" (1.778 m) and weight is 103 kg (227 lb). His temperature is 97.9 °F (36.6 °C). His blood pressure is 169/117 (abnormal) and his pulse is 77. His respiration is 20 and oxygen saturation is 96%.     Chief Complaint: Flank Pain    Pt reports left sided pain after doing pull ups Monday. He said it went away Tuesday but came back bad Tuesday. He took advil at 9am this morning. He reports he can't sit still and squirms around a lot. He keeps hitting the area to relive the pain. He tried taking advil this morning with minimal relief. He states pain is currently a 10/10. Denies any urinary issues. Denies hx of kidney stones. Denies hx of abdominal surgeries. Denies diarrhea or constipation. Patient feeling slightly nauseous from the pain and has not eaten anything today.     Flank Pain  This is a new problem. The current episode started in the past 7 days. The problem occurs constantly. The problem is unchanged. The quality of the pain is described as aching, shooting and stabbing. The pain does not radiate. The pain is at a severity of 10/10. The pain is severe. The pain is the same all the time. The symptoms are aggravated by bending, lying down, stress, urinating and sitting. Stiffness is present all day. Associated symptoms include abdominal pain. Pertinent negatives include no dysuria or fever.       Constitution: Negative for chills, sweating, fatigue and fever.   Neck: Negative for neck pain and neck stiffness.   Eyes: Negative for eye pain and eye redness.   Gastrointestinal: Positive for abdominal pain. Negative for nausea, vomiting, constipation and diarrhea.   Genitourinary: Positive for flank pain. Negative for dysuria, frequency and urgency.   Neurological: Negative for disorientation and altered mental status.   Psychiatric/Behavioral: Negative for altered mental status, disorientation and confusion.       Objective: "      Physical Exam   Constitutional: He is cooperative. No distress.      Comments:Patient sits in exam chair shifting his weight often. Answers questions in complete sentences. Does not show any signs of respiratory distress or discoloration.      awake  Abdominal: Bowel sounds are normal. Soft. flat abdomen There is abdominal tenderness in the left lower quadrant. There is no rebound, no guarding, no tenderness at McBurney's point, negative Balderrama's sign, no left CVA tenderness, negative Rovsing's sign, negative psoas sign and no right CVA tenderness. No hernia.      Comments: Pain BETTER WITH palpation. There is no guarding or rebound on exam.    Neurological: He is alert.         Results for orders placed or performed in visit on 06/29/22   POCT Urinalysis, Dipstick, Automated, W/O Scope   Result Value Ref Range    POC Blood, Urine Positive (A) Negative    POC Bilirubin, Urine Negative Negative    POC Urobilinogen, Urine normal 0.3 - 2.2    POC Ketones, Urine Positive (A) Negative    POC Protein, Urine Negative Negative    POC Nitrates, Urine Negative Negative    POC Glucose, Urine Negative Negative    pH, UA 5.5 5 - 8    POC Specific Gravity, Urine 1.005 1.003 - 1.029    POC Leukocytes, Urine Negative Negative       Assessment:       1. Kidney stone on left side    2. Flank pain    3. Left lower quadrant abdominal pain    4. Constipation, unspecified constipation type          Plan:       Refusing toradol shot. Patient pain mildly alleviated after 1 g of tylenol. UA positive for blood and ketones. Based off history, physical and UA, this is likely kidney stones. KUB negative. Will prescribe flomax and Norco for pain control. Educated patient on diet to prevent kidney stones. Patient encouraged to drink plenty of water. Patient given ED precautions. Patient expresses understanding. He leaves clinic in no signs of distress.     Kidney stone on left side  -     HYDROcodone-acetaminophen (NORCO) 7.5-325 mg per  tablet; Take 1 tablet by mouth every 6 (six) hours as needed for Pain.  Dispense: 20 tablet; Refill: 0  -     Discontinue: tamsulosin (FLOMAX) 0.4 mg Cap; Take 1 capsule (0.4 mg total) by mouth once daily. for 14 days  Dispense: 14 capsule; Refill: 0  -     tamsulosin (FLOMAX) 0.4 mg Cap; Take 1 capsule (0.4 mg total) by mouth once daily. for 14 days  Dispense: 14 capsule; Refill: 0    Flank pain  -     acetaminophen tablet 1,000 mg  -     POCT Urinalysis, Dipstick, Automated, W/O Scope    Left lower quadrant abdominal pain  -     XR ABDOMEN FLAT AND ERECT; Future; Expected date: 06/29/2022    Constipation, unspecified constipation type  -     Discontinue: polyethylene glycol (GLYCOLAX) 17 gram PwPk; Take 17 g by mouth once daily. for 14 days  Dispense: 14 each; Refill: 0  -     polyethylene glycol (GLYCOLAX) 17 gram PwPk; Take 17 g by mouth once daily. for 14 days  Dispense: 14 each; Refill: 0                 Patient Instructions   Dietary patterns -- The combination of dietary factors may also have a significant impact upon stone risk. As an example, the DASH diet is high in fruits and vegetables, moderate in low-fat dairy products, and low in animal protein. Based upon an analysis of three large cohorts, adherence to a DASH-style diet was associated with a 40 to 45 percent lower risk for incident kidney stones among males, older females, younger females, high-BMI individuals, and low-BMI individuals [132].    Another example is the Mediterranean diet, a healthy diet associated with lower risk of several conditions including cardiovascular disease and diabetes mellitus that was also found to be associated with a lower risk of kidney stones [133,134]. In the longitudinal study of three large cohorts, higher adherence to a Mediterranean dietary pattern was associated with between 13 and 41 percent lower risk of incident kidney stones independent of potential confounders [134]. Higher adherence to the Mediterranean  diet was also associated with higher urinary citrate, magnesium, oxalate, phosphate, uric acid, volume, and pH, and lower urinary sodium, resulting in an overall lower supersaturation for calcium oxalate, calcium phosphate, and uric acid.    Thus, the DASH and Mediterranean diets are reasonable options in the attempt to reduce the risk of stone recurrence. One randomized trial of the DASH diet found a suggestion of a reduced risk based upon change in 24-hour urine chemistries [135], although there are no randomized trials that have examined the effectiveness on reducing risk of actual recurrent kidney stone formation.    - Rest.    - Drink plenty of fluids.    - Acetaminophen (tylenol) or Ibuprofen (advil,motrin) as directed as needed for fever/pain. Avoid tylenol if you have a history of liver disease. Do not take ibuprofen if you have a history of GI bleeding, kidney disease, or if you take blood thinners.   - Ibuprofen dosing for adults: 400 mg by mouth every 4-6 hours as needed. Max: 2400 mg/day; Info: use lowest effective dose, shortest effective treatment duration; give w/ food if GI upset occurs.  - Tylenol dosing for adults: [By mouth route, immediate-release form] Dose: 325-1000 mg by mouth every 4-6h as needed; Max: 1 g/4h and 4 g/day from all sources. [By mouth route, extended-release form] Dose: 650-1300 mg Extended Release by mouth every 8h as needed; Max: 4 g/day from all sources.     - You must understand that you have received an Urgent Care treatment only and that you may be released before all of your medical problems are known or treated.   - You, the patient, will arrange for follow up care as instructed.   - If your condition worsens or fails to improve we recommend that you receive another evaluation at the ER immediately or contact your PCP to discuss your concerns or return here.   - Follow up with your PCP or specialty clinic as directed in the next 1-2 weeks if not improved or as needed.   You can call [phone] to schedule an appointment with the appropriate provider.    If your symptoms do not improve or worsen, go to the emergency room immediately.

## 2023-04-11 ENCOUNTER — PATIENT MESSAGE (OUTPATIENT)
Dept: RESEARCH | Facility: HOSPITAL | Age: 43
End: 2023-04-11
Payer: COMMERCIAL